# Patient Record
Sex: FEMALE | Race: WHITE | NOT HISPANIC OR LATINO | Employment: FULL TIME | URBAN - METROPOLITAN AREA
[De-identification: names, ages, dates, MRNs, and addresses within clinical notes are randomized per-mention and may not be internally consistent; named-entity substitution may affect disease eponyms.]

---

## 2017-09-28 ENCOUNTER — TELEPHONE (OUTPATIENT)
Dept: SPINE | Facility: CLINIC | Age: 37
End: 2017-09-28

## 2017-09-28 NOTE — TELEPHONE ENCOUNTER
Called and spoke with patient.  Informed her of the consult process.  Patient will arrive an hour early for xray and will bring her MRI  Report and disc with her. Appointment letter mailed to patient.

## 2017-09-28 NOTE — TELEPHONE ENCOUNTER
----- Message from Karol Urrutia RN sent at 9/21/2017  1:09 PM CDT -----  Contact: Self  Please see below and schedule patient on with Priya on a day that Dr. Moon is in clinic.   ----- Message -----  From: Indy Donald  Sent: 9/21/2017  12:47 PM  To: [facility] Staff    Good afternoon,     Pt is requesting an appt due to spine problems (appt books are not open).    Pt can be reached at [phone].    Thank you!

## 2017-12-04 ENCOUNTER — TELEPHONE (OUTPATIENT)
Dept: SPINE | Facility: CLINIC | Age: 37
End: 2017-12-04

## 2017-12-04 DIAGNOSIS — M54.50 LOW BACK PAIN WITHOUT SCIATICA, UNSPECIFIED BACK PAIN LATERALITY, UNSPECIFIED CHRONICITY: Primary | ICD-10-CM

## 2017-12-05 ENCOUNTER — HOSPITAL ENCOUNTER (OUTPATIENT)
Dept: RADIOLOGY | Facility: OTHER | Age: 37
Discharge: HOME | End: 2017-12-05
Attending: PHYSICIAN ASSISTANT
Payer: COMMERCIAL

## 2017-12-05 ENCOUNTER — OFFICE VISIT (OUTPATIENT)
Dept: SPINE | Facility: CLINIC | Age: 37
End: 2017-12-05
Payer: COMMERCIAL

## 2017-12-05 VITALS
HEART RATE: 101 BPM | HEIGHT: 66 IN | WEIGHT: 146 LBS | SYSTOLIC BLOOD PRESSURE: 132 MMHG | BODY MASS INDEX: 23.46 KG/M2 | DIASTOLIC BLOOD PRESSURE: 83 MMHG

## 2017-12-05 DIAGNOSIS — M54.42 CHRONIC BILATERAL LOW BACK PAIN WITH LEFT-SIDED SCIATICA: ICD-10-CM

## 2017-12-05 DIAGNOSIS — M47.819 SPONDYLOSIS WITHOUT MYELOPATHY: ICD-10-CM

## 2017-12-05 DIAGNOSIS — G89.29 CHRONIC BILATERAL LOW BACK PAIN WITH LEFT-SIDED SCIATICA: ICD-10-CM

## 2017-12-05 DIAGNOSIS — R20.0 PERINEAL NUMBNESS: Primary | ICD-10-CM

## 2017-12-05 DIAGNOSIS — M54.50 LOW BACK PAIN WITHOUT SCIATICA, UNSPECIFIED BACK PAIN LATERALITY, UNSPECIFIED CHRONICITY: ICD-10-CM

## 2017-12-05 DIAGNOSIS — Z03.89 OBSERVATION FOR SUSPECTED MEDICAL CONDITIONS: ICD-10-CM

## 2017-12-05 DIAGNOSIS — M54.14 THORACIC AND LUMBOSACRAL NEURITIS: ICD-10-CM

## 2017-12-05 DIAGNOSIS — M54.17 THORACIC AND LUMBOSACRAL NEURITIS: ICD-10-CM

## 2017-12-05 DIAGNOSIS — M62.50 MUSCULAR ATROPHY, UNSPECIFIED SITE: ICD-10-CM

## 2017-12-05 PROCEDURE — 99204 OFFICE O/P NEW MOD 45 MIN: CPT | Mod: S$GLB,,, | Performed by: PHYSICIAN ASSISTANT

## 2017-12-05 PROCEDURE — 99999 PR PBB SHADOW E&M-EST. PATIENT-LVL IV: CPT | Mod: PBBFAC,,, | Performed by: PHYSICIAN ASSISTANT

## 2017-12-05 PROCEDURE — 72100 X-RAY EXAM L-S SPINE 2/3 VWS: CPT | Mod: TC

## 2017-12-05 PROCEDURE — 72100 X-RAY EXAM L-S SPINE 2/3 VWS: CPT | Mod: 26,,, | Performed by: RADIOLOGY

## 2017-12-05 PROCEDURE — 72120 X-RAY BEND ONLY L-S SPINE: CPT | Mod: 26,,, | Performed by: RADIOLOGY

## 2017-12-05 RX ORDER — OXYCODONE AND ACETAMINOPHEN 10; 325 MG/1; MG/1
1 TABLET ORAL
COMMUNITY
Start: 2017-07-19 | End: 2018-07-19

## 2017-12-05 RX ORDER — BACLOFEN 10 MG/1
10 TABLET ORAL 3 TIMES DAILY
COMMUNITY

## 2017-12-05 RX ORDER — METAXALONE 800 MG/1
800 TABLET ORAL
COMMUNITY
Start: 2017-11-21 | End: 2018-11-21

## 2017-12-05 RX ORDER — GABAPENTIN 100 MG/1
100 CAPSULE ORAL
COMMUNITY
Start: 2017-11-21 | End: 2018-11-21

## 2017-12-05 RX ORDER — NORGESTIMATE AND ETHINYL ESTRADIOL 7DAYSX3 LO
1 KIT ORAL
COMMUNITY

## 2017-12-05 RX ORDER — METHOCARBAMOL 500 MG/1
500 TABLET, FILM COATED ORAL
COMMUNITY
Start: 2017-10-23 | End: 2018-10-23

## 2017-12-05 RX ORDER — PHENTERMINE HYDROCHLORIDE 37.5 MG/1
1 TABLET ORAL
COMMUNITY
Start: 2017-04-05

## 2017-12-07 NOTE — PROGRESS NOTES
Subjective:     Patient ID:  Laura Barragan is a 37 y.o. female.    Self-Referral to Dr. Moon    Chief Complaint:   Back and left leg pain    HPI    Laura Barragan is a 37 y.o. female who presents with the above CC.  Patient had back surgery in 2014 which included a left L5-S1 microdisectomy with Dr. Hanks in [facility] and then ended up having a L5-S1 fusion with Dr. Shaw about 1-2 months later in [place].  She gradually got somewhat better after that and last saw her surgeon in 2015 and then was referred to pain management who she has been following with.  She is currently following with Dr. Thompson and his NP Sathish and takes baclofen, neurontin, robaxin, and percocet.  She has never had spine injections and PT has not helped.    She currently has constant back pain rated 5/10 that is across the low back and is worse with sitting, standing, and walking and better with laying down.  Pain radiates down the left posterior leg with cramping to the ankle rated 3/10 and no specific position makes it worse and switching positions makes it better.    She is concerned because she has left buttock muscle atrophy and left calf muscle atrophy which started 8-9 months after surgery.      For the past year she has has numbness vaginal and labial area that is constant.    Patient has not had PT or ESIs.  No spine surgery.  Patient is currently taking     Patient denies any recent accidents or trauma, no saddle anesthesias, and no bowel or bladder incontinence.      Review of Systems:  Please refer to page three of the spine center intake form for a complete review of systems.    Past Medical History:   Diagnosis Date    Post laminectomy syndrome      Past Surgical History:   Procedure Laterality Date    BACK SURGERY      C5-6 fusion ACDF      L5S1 TLIF      microdiscectomy       No current outpatient prescriptions on file prior to visit.     No current facility-administered medications on file prior to visit.   "    Review of patient's allergies indicates:   Allergen Reactions    Nitrofurantoin Shortness Of Breath     tight chest    Penicillins Itching    Latex Itching and Other (See Comments)     redness    Other omega-3s      powder in gloves, skin turns red if prolonged exposed     Social History     Social History    Marital status:      Spouse name: N/A    Number of children: N/A    Years of education: N/A     Occupational History    Not on file.     Social History Main Topics    Smoking status: Never Smoker    Smokeless tobacco: Never Used    Alcohol use Yes    Drug use: No    Sexual activity: Not on file     Other Topics Concern    Not on file     Social History Narrative    No narrative on file     Family History   Problem Relation Age of Onset    Hypertension Mother     COPD Mother     Kidney failure Mother     COPD Father        Objective:      Vitals:    12/05/17 1111   BP: 132/83   Pulse: 101   Weight: 66.2 kg (146 lb)   Height: 5' 6" (1.676 m)   PainSc:   4   PainLoc: Back         Physical Exam:    General:  Laura Barragan is well-developed, well-nourished, appears stated age, in no acute distress, alert and oriented to person, place, and time.    Pulmonary/Chest:  Respiratory effort normal  Abdominal: Exhibits no distension  Psychiatric:  Normal mood and affect.  Behavior is normal.  Judgement and thought content normal    Musculoskeletal:    Patient arises from a sitting to standing position without difficulty.  Patient walks to the door without evidence of limp, pain, or abnormality of gait. Patient is able to walk on heels and toes without difficulty.    Lumbar ROM:   Pain in lumbar flexion, extension and worse in extension.  No pain in right lateral bending and left lateral bending.    Lumbar Spine Inspection:  Normal with no surgical scars and no visible rashes.    Lumbar Spine Palpation:  No tenderness to low back palpation.    SI Joint Palpation:  No tenderness to SI Joint " palpation.    Straight Leg Raise:  Negative right and left SLR.    Neurological: Alert and oriented to person, place, and time    Muscle strength against resistance:     Right Left   Hip flexion  5 / 5 5 / 5   Hip extension 5 / 5 5 / 5   Hip abduction 5 / 5 5 / 5   Hip adduction  5 / 5 5 / 5   Knee extension  5 / 5 5 / 5   Knee flexion 5 / 5 5 / 5   Dorsiflexion  5 / 5 4+ / 5   EHL  5 / 5 4+ / 5   Plantar flexion  5 / 5 4+ / 5   Inversion of the feet 5 / 5 5 / 5   Eversion of the feet  5 / 5 5 / 5     Reflexes:     Right Left   Patellar 2+ 2+   Achilles 2+ 2+     Clonus:  Negative bilaterally    On gross examination of the bilateral upper extremities, patient has full painfree ROM with no signs of clubbing, cyanosis, edema, or weakness.     XRAY/MRI Interpretation:     Lumbar spine ap/lateral/flexion/extension xrays were personally reviewed today and also reviewed with Dr. Moon.  No fractures.  No movement on flexion and extension.  Instrumentation at L5-S1 intact.    Lumbar spine MRI was personally reviewed today on a CD and also reviewed with Dr. Moon and the CD returned to the patient.  Left L5-S1 laminectomy defect.  No central stenosis, NFS, or HNP    EMG/NCS from 01/2017 reviewed with Dr. Moon states evidence of moderate chronic S1 radiculopathy on the left.      Assessment:          1. Perineal numbness    2. Spondylosis without myelopathy    3. Thoracic and lumbosacral neuritis    4. Chronic bilateral low back pain with left-sided sciatica    5. Muscular atrophy, unspecified site    6. Observation for suspected medical conditions            Plan:          Orders Placed This Encounter    MRI Pelvis W WO Contrast    MRI Pelvis W WO Contrast    MRI Pelvis W WO Contrast       Patient with persistent left S1 radiculopathy since her spine surgery in 2014.    -No spine surgery indicated  -Will get MRI pelvis with and without contrast done to see if there is another source for her left vaginal and  labial numbness and buttock atrophy and I will review with Dr. Moon after  -Continue with pain management in [place] and consider left L5 TESI and left S1 TESI in the future  -Could also consider SCS trial if the ESIs do not help    All of the above discussed and reviewed with Dr. Moon who came in to see the patient today.      Follow-Up:  Return if symptoms worsen or fail to improve. If there are any questions prior to this, the patient was instructed to contact the office.       DEBORAH Josue, PA-C  Neurosurgery  [facility]  [facility]

## 2017-12-19 ENCOUNTER — TELEPHONE (OUTPATIENT)
Dept: SPINE | Facility: CLINIC | Age: 37
End: 2017-12-19

## 2017-12-20 NOTE — TELEPHONE ENCOUNTER
----- Message from Alexandra Velez sent at 12/19/2017  2:28 PM CST -----  Contact: Karen ([facility])  x_  1st Request  _  2nd Request  _  3rd Request    Who: Karen ([facility])    Why: Karen would like to speak with staff in reference to the diagnoses code for the patient's MRI. Pt is in the clinic now.     What Number to Call Back: [phone]    When to Expect a call back: (Within 24 hours)    Please return the call at earliest convenience. Thanks!

## 2017-12-28 ENCOUNTER — TELEPHONE (OUTPATIENT)
Dept: SPINE | Facility: CLINIC | Age: 37
End: 2017-12-28

## 2017-12-28 NOTE — TELEPHONE ENCOUNTER
----- Message from Beronica Leavitt sent at 12/28/2017 12:46 PM CST -----  Contact: Karen  x 1st Request  _ 2nd Request  _ 3rd Request    Who:Karen ([facility])    Why:Karen would like to speak with staff in reference to the diagnoses code for the patient's MRI.     What Number to Call Back:[phone] and [phone]     When to Expect a call back: (Before the end of the day)  -- if call after 3:00 call back will be tomorrow.

## 2018-01-11 ENCOUNTER — TELEPHONE (OUTPATIENT)
Dept: SPINE | Facility: CLINIC | Age: 38
End: 2018-01-11

## 2018-01-11 NOTE — TELEPHONE ENCOUNTER
Called and spoke with the MRI dept.  They stated that they will need an new order faxed over with diagnosis that correlates with the MRI.  Please place new order.

## 2018-01-11 NOTE — TELEPHONE ENCOUNTER
----- Message from Beronica Leavitt sent at 1/10/2018 10:30 AM CST -----  Contact: Abdon gr 1st Request  _ 2nd Request  _ 3rd Request    Who: Mirella from [facility]     Why: Mirella requesting to speak to nurse in regards to pt order for a MRI. Please call and advise.      What Number to Call Back: [phone]     When to Expect a call back: (Before the end of the day)  -- if call after 3:00 call back will be tomorrow.

## 2018-01-12 NOTE — TELEPHONE ENCOUNTER
MRI pelvis ordered.    Please print out for me to sign.    DEBORAH Josue, PA-C  Neurosurgery  [facility]  [facility]

## 2018-01-19 ENCOUNTER — PATIENT MESSAGE (OUTPATIENT)
Dept: SPINE | Facility: CLINIC | Age: 38
End: 2018-01-19

## 2018-01-19 NOTE — TELEPHONE ENCOUNTER
Please call that number and see if they got the addended note with the new diagnosis codes.    If they still got that new clinic not and it is still denied then please ask what exactly that code the patient provided is that she states will work?    Thanks,  DEBORAH Josue, PA-C  Neurosurgery  [facility]  [facility]

## 2018-01-19 NOTE — TELEPHONE ENCOUNTER
I had already addressed this by adding two additional diagnoses to my clinic encounter and you were supposed to submit that.    Was that done?    DEBORAH Josue, PA-C  Neurosurgery  [facility]  [facility]

## 2018-01-22 ENCOUNTER — PATIENT MESSAGE (OUTPATIENT)
Dept: SPINE | Facility: CLINIC | Age: 38
End: 2018-01-22

## 2018-01-23 ENCOUNTER — TELEPHONE (OUTPATIENT)
Dept: SPINE | Facility: CLINIC | Age: 38
End: 2018-01-23

## 2018-01-23 NOTE — TELEPHONE ENCOUNTER
----- Message from Nato Holloway sent at 1/23/2018  1:04 PM CST -----  Contact: Karen with [facility]  x_ 1st Request   _ 2nd Request   _ 3rd Request     Who: ARAM DAVENPORT [[id number]]    Why:  Karen called is requesting that you put the code Z 03.89 on the MRI orders and re-fax to [phone].  The patient is scheduled for tomorrow morning and they need them today or the patient will need to be rescheduled.    What Number to Call Back: [phone]    When to Expect a call back: (Before the end of the day)   -- if call after 3:00 call back will be tomorrow.

## 2018-01-23 NOTE — TELEPHONE ENCOUNTER
Ok that code added and new MRI order printed.    DEBORAH Josue, PA-C  Neurosurgery  [facility]  [facility]

## 2018-02-23 ENCOUNTER — PATIENT MESSAGE (OUTPATIENT)
Dept: SPINE | Facility: CLINIC | Age: 38
End: 2018-02-23

## 2018-03-16 ENCOUNTER — TELEPHONE (OUTPATIENT)
Dept: SPINE | Facility: CLINIC | Age: 38
End: 2018-03-16

## 2018-03-16 NOTE — TELEPHONE ENCOUNTER
Please let patient know that the report of the MRI of her pelvis shows only some inflammation in her right gluteus (butt). Nothing that would explain her left vaginal/labial numbness.     If she mails us the CD, I can have Red review it to make sure he doesn't see anything else.

## 2018-06-03 ENCOUNTER — TELEPHONE (OUTPATIENT)
Dept: SPINE | Facility: CLINIC | Age: 38
End: 2018-06-03

## 2018-06-04 ENCOUNTER — PATIENT MESSAGE (OUTPATIENT)
Dept: SPINE | Facility: CLINIC | Age: 38
End: 2018-06-04

## 2018-06-04 NOTE — TELEPHONE ENCOUNTER
Please let the patient know that I just received her CD and I will be out for the next two weeks and I will review her MRI with Dr. Moon when I get back.    Thanks,  DEBORAH Josue, PA-C  Neurosurgery  [facility]  [facility]

## 2018-07-11 ENCOUNTER — PATIENT MESSAGE (OUTPATIENT)
Dept: SPINE | Facility: CLINIC | Age: 38
End: 2018-07-11